# Patient Record
Sex: MALE | Race: WHITE
[De-identification: names, ages, dates, MRNs, and addresses within clinical notes are randomized per-mention and may not be internally consistent; named-entity substitution may affect disease eponyms.]

---

## 2018-05-21 ENCOUNTER — HOSPITAL ENCOUNTER (OUTPATIENT)
Dept: HOSPITAL 75 - RAD | Age: 23
End: 2018-05-21
Attending: NURSE PRACTITIONER
Payer: COMMERCIAL

## 2018-05-21 DIAGNOSIS — E01.0: Primary | ICD-10-CM

## 2018-05-21 PROCEDURE — 76536 US EXAM OF HEAD AND NECK: CPT

## 2018-05-21 NOTE — DIAGNOSTIC IMAGING REPORT
PROCEDURE: US Thyroid.



TECHNIQUE: Multiple real-time grayscale images were obtained of

the thyroid in various projections.



INDICATION: Thyromegaly.



FINDINGS: The right lobe of the thyroid measures 5.3 x 1.4 x 1.9

cm and the left lobe measures 5.6 x 1.3 x 1.6 cm. Both lobes

demonstrate homogeneous echotexture. No discrete thyroid mass is

detected.



IMPRESSION: Unremarkable thyroid ultrasound.



Dictated by: 



  Dictated on workstation # [id number]